# Patient Record
Sex: FEMALE | Race: BLACK OR AFRICAN AMERICAN | NOT HISPANIC OR LATINO | Employment: OTHER | URBAN - NONMETROPOLITAN AREA
[De-identification: names, ages, dates, MRNs, and addresses within clinical notes are randomized per-mention and may not be internally consistent; named-entity substitution may affect disease eponyms.]

---

## 2023-02-21 ENCOUNTER — TELEPHONE (OUTPATIENT)
Dept: FAMILY MEDICINE CLINIC | Facility: CLINIC | Age: 73
End: 2023-02-21

## 2023-02-21 NOTE — TELEPHONE ENCOUNTER
[facility] Nurse called to let [place] know that the patients is not critical.       [phone]

## 2023-02-28 ENCOUNTER — TELEPHONE (OUTPATIENT)
Dept: FAMILY MEDICINE CLINIC | Facility: CLINIC | Age: 73
End: 2023-02-28

## 2023-02-28 DIAGNOSIS — N18.4 CKD (CHRONIC KIDNEY DISEASE) STAGE 4, GFR 15-29 ML/MIN: Primary | ICD-10-CM

## 2023-02-28 NOTE — TELEPHONE ENCOUNTER
Caller: Veronica Ramirez    Relationship to patient: Self    Best call back number: [phone]    PATIENT IS CALLING TO STATE THAT SHE WOULD LIKE A CALL BACK FROM DR JACKSON TO DISCUSS CHANGING A MEDICATION.

## 2023-02-28 NOTE — TELEPHONE ENCOUNTER
Called pt and she wants to switch the Amlodipine 5mg because she read about the side effects and didn't want to take it. Wants to switch to something that had less side effects. Please advise..

## 2023-02-28 NOTE — TELEPHONE ENCOUNTER
She admits its been a while since have seen her I think I both basically saw her at her house with her mom when I was doing a housecall on her mom and am not sure if really established with her as a doctor patient yet.    Anyway she was just in [facility] for 2 weeks after she had a stroke and a UTI she was discharged on amlodipine and a baby aspirin she says she is not taking the Protonix that was in the discharge summary there she has no stomach issues or reflux.  She has an SUV and is feeling it is good to be hard for her to get up in the car to get here.  Even family members have SUVs that are difficult for her to get into.  [facility] are seeing her at the house and saying they may discontinue care Thursday this week but she does get physical therapy and Occupational Therapy.    In review of her labs she was little anemic with a hemoglobin of 9 and her creatinine was elevated 2.6.  She does not have a nephrologist.    I recommended getting repeat blood work and perhaps [facility] can draw CMP and a CBC and we will get her into see nephrology also but she will need transportation help.    I told her I would mind going to do a housecall mid-March for follow-up otherwise.  She can call to remind me about this later.    Please call care tenders asked them to draw a CBC and a CMP

## 2023-03-17 ENCOUNTER — TELEPHONE (OUTPATIENT)
Dept: FAMILY MEDICINE CLINIC | Facility: CLINIC | Age: 73
End: 2023-03-17
Payer: MEDICARE

## 2023-03-17 RX ORDER — AMLODIPINE BESYLATE 5 MG/1
5 TABLET ORAL DAILY
Qty: 30 TABLET | Refills: 0 | Status: SHIPPED | OUTPATIENT
Start: 2023-03-17 | End: 2023-03-20 | Stop reason: SDUPTHER

## 2023-03-17 RX ORDER — AMLODIPINE BESYLATE 5 MG/1
5 TABLET ORAL DAILY
COMMUNITY
End: 2023-03-17 | Stop reason: SDUPTHER

## 2023-03-17 NOTE — TELEPHONE ENCOUNTER
Caller: Veronica Ramirez    Relationship: Self    Best call back number: [phone]  Requested Prescriptions:   amLODIPine (NORVASC) 5 MG tablet     Pharmacy where request should be sent: [facility]/PHARMACY #[id number] [place] [id number] [address] - [phone]  - [phone] FX     Additional details provided by patient: UNABLE TO COME IN FOR APPOINTMENT, NORMALLY DOES TELEHEALTH APPOINTMENT. PLEASE CALL IF ANY QUESTIONS.     Does the patient have less than a 3 day supply:  [] Yes  [x] No    Would you like a call back once the refill request has been completed: [] Yes [x] No    If the office needs to give you a call back, can they leave a voicemail: [] Yes [x] No    Edgardo Goode Rep   03/17/23 14:15 EDT

## 2023-03-20 ENCOUNTER — OFFICE VISIT (OUTPATIENT)
Dept: FAMILY MEDICINE CLINIC | Facility: CLINIC | Age: 73
End: 2023-03-20
Payer: MEDICARE

## 2023-03-20 VITALS — SYSTOLIC BLOOD PRESSURE: 154 MMHG | OXYGEN SATURATION: 98 % | HEART RATE: 65 BPM | DIASTOLIC BLOOD PRESSURE: 98 MMHG

## 2023-03-20 DIAGNOSIS — M15.9 OSTEOARTHRITIS, GENERALIZED: ICD-10-CM

## 2023-03-20 DIAGNOSIS — Z91.199 HISTORY OF NONCOMPLIANCE WITH MEDICAL TREATMENT: ICD-10-CM

## 2023-03-20 DIAGNOSIS — I63.9 ACUTE CVA (CEREBROVASCULAR ACCIDENT): Primary | ICD-10-CM

## 2023-03-20 DIAGNOSIS — I25.10 CORONARY ARTERY DISEASE INVOLVING NATIVE CORONARY ARTERY OF NATIVE HEART WITHOUT ANGINA PECTORIS: ICD-10-CM

## 2023-03-20 DIAGNOSIS — N18.4 CKD (CHRONIC KIDNEY DISEASE) STAGE 4, GFR 15-29 ML/MIN: ICD-10-CM

## 2023-03-20 DIAGNOSIS — I10 PRIMARY HYPERTENSION: ICD-10-CM

## 2023-03-20 DIAGNOSIS — D64.9 CHRONIC ANEMIA: ICD-10-CM

## 2023-03-20 DIAGNOSIS — E66.01 MORBID (SEVERE) OBESITY DUE TO EXCESS CALORIES: ICD-10-CM

## 2023-03-20 DIAGNOSIS — E78.2 MIXED HYPERLIPIDEMIA: ICD-10-CM

## 2023-03-20 PROCEDURE — 3080F DIAST BP >= 90 MM HG: CPT | Performed by: FAMILY MEDICINE

## 2023-03-20 PROCEDURE — 3077F SYST BP >= 140 MM HG: CPT | Performed by: FAMILY MEDICINE

## 2023-03-20 PROCEDURE — 99215 OFFICE O/P EST HI 40 MIN: CPT | Performed by: FAMILY MEDICINE

## 2023-03-20 RX ORDER — AMLODIPINE BESYLATE 5 MG/1
5 TABLET ORAL DAILY
Qty: 90 TABLET | Refills: 1 | Status: SHIPPED | OUTPATIENT
Start: 2023-03-20

## 2023-03-20 RX ORDER — FERROUS SULFATE 325(65) MG
325 TABLET ORAL
COMMUNITY

## 2023-03-20 RX ORDER — ASPIRIN 81 MG/1
81 TABLET, CHEWABLE ORAL DAILY
COMMUNITY

## 2023-03-20 NOTE — PROGRESS NOTES
Follow Up Office Visit      Patient Name: Veronica Ramirez  : 1950   MRN: [id number]     Chief Complaint:  No chief complaint on file.  This is a housecall as she has difficulty getting into the office    History of Present Illness: Veronica Ramirez is a 72 y.o. female who is here today to   follow-up on her recent stroke.  Stroke date was 2023 she reports that she was at [facility] for about 5 days and then was transferred to [facility] for a couple weeks.  Admission date at [facility] was dated 2/3/2023 discharge date 2023    Discharge summary reviewed discharge diagnosis was #1 acute CVA with late effects of a right hemiparesis dysarthric speech dysphagia balance gait disturbance/memory cognition disturbance.  Capitalization was complicated with hypertension acute kidney injury dehydration and electrolyte imbalance and UTI.    Secondary diagnosis atherosclerotic vascular disease/CAD although patient says she has never been told she has had blockages or had a heart attack.  #3 hypertension #4 hyperlipidemia #5 chronic anemia #6 morbid obesity #7 osteoarthritis and #8 medical noncompliance    She is left-handed and stroke affected her right side she still does have some weakness on that side says she initially had trouble standing up and the right leg would give away on her.  She is using a walker now at the house and is getting home health therapy with [facility] and physical therapy there as well.  He reports that her blood pressure when they come there is normal and they check her blood pressure on her left forearm typically.    Review of Systems   Constitutional: Negative for fatigue and fever.   Respiratory: Negative for cough and shortness of breath.    Cardiovascular: Negative for chest pain and palpitations.   Skin: Negative for rash or itching  Has had no further stroke symptoms and is getting stronger.  She has had no GI blood loss no melena.   She says she is not taking her pantoprazole she has she does not have stomach problems and does not want to take any stomach medicines.    She was sent home on 3 medicines baby aspirin amlodipine 5 mg and Protonix 20 mg.    Hemoglobin in the [facility] reported is 9.3 HCT 28 her electrolyte panel showed a creatinine of 2.6 BUN of 36 glucose was 79 potassium 4.5 and sodium 140 and calcium 8.9 from February 17    She reports she is doing well but has trouble walking very far and says she cannot climb up into an SUV to get in here to the office therefore she asked me to do the home visit which I did  She is working on her living will and her Sister Sapna would be her main decision maker and she signed Sapna up for the HIPAA release as well.    Sapna Tejeda is her name her phone number is [phone]    Subjective      Review of Systems:   Review of Systems    Past Medical History:   Past Medical History:   Diagnosis Date   • Kidney stones    • Morbid obesity (HCC)        Past Surgical History:   Past Surgical History:   Procedure Laterality Date   • BREAST LUMPECTOMY      Breast BX - 4 Lumpectomy 17, and 26 YO--Benign   • REDUCTION MAMMAPLASTY      Surgery at 37       Family History:   Family History   Problem Relation Age of Onset   • Cancer Mother    • Hypertension Mother    • Obesity Mother        Social History:   Social History     Socioeconomic History   • Marital status: Single       Medications:     Current Outpatient Medications:   •  amLODIPine (NORVASC) 5 MG tablet, Take 1 tablet by mouth Daily., Disp: 90 tablet, Rfl: 1  •  aspirin 81 MG chewable tablet, Chew 1 tablet Daily., Disp: , Rfl:   •  ferrous sulfate 325 (65 FE) MG tablet, Take 1 tablet by mouth Daily With Breakfast., Disp: , Rfl:     Allergies:   Not on File    Objective     Physical Exam:  Vital Signs:   Vitals:    03/20/23 1820   BP: 154/98   BP Location: Left arm   Patient Position: Sitting   Cuff Size: Large Adult   Pulse: 65   SpO2:  98%     There is no height or weight on file to calculate BMI.     Physical Exam  Vitals and nursing note reviewed.   Constitutional:       Appearance: Normal appearance. She is obese.      Comments: Alert pleasant black female sitting in a chair with her walker in front of her in no acute distress will to move all 4 extremities with a very subtle weakness on the right lower extremity with plantar and dorsiflexion of the foot   HENT:      Head: Normocephalic and atraumatic.   Eyes:      Conjunctiva/sclera: Conjunctivae normal.   Cardiovascular:      Rate and Rhythm: Normal rate and regular rhythm.      Comments: 1+ radial pulses bilaterally  Pulmonary:      Effort: Pulmonary effort is normal.      Breath sounds: Normal breath sounds.   Abdominal:      Palpations: Abdomen is soft.      Tenderness: There is no abdominal tenderness. There is no guarding or rebound.   Musculoskeletal:         General: Normal range of motion.      Cervical back: Normal range of motion and neck supple.      Right lower leg: Edema present.      Left lower leg: Edema present.      Comments: Brawny 4+ edema of both lower extremities   Skin:     General: Skin is warm and dry.   Neurological:      General: No focal deficit present.      Mental Status: She is alert and oriented to person, place, and time.      Motor: Weakness present.      Comments: Minimal weakness on the right lower extremity versus left good strength of both upper extremities good  strength   Psychiatric:         Mood and Affect: Mood normal.         Behavior: Behavior normal.         Procedures    PHQ-9 Total Score:       Assessment / Plan      Assessment/Plan:   Diagnoses and all orders for this visit:    1. Acute CVA (cerebrovascular accident) (HCC) (Primary)    2. Coronary artery disease involving native coronary artery of native heart without angina pectoris    3. Primary hypertension  -     amLODIPine (NORVASC) 5 MG tablet; Take 1 tablet by mouth Daily.  Dispense:  90 tablet; Refill: 1    4. Mixed hyperlipidemia    5. Chronic anemia    6. Morbid (severe) obesity due to excess calories ([facility])    7. Body mass index (BMI) of 60.0 to 69.9 in adult ([facility])    8. Osteoarthritis, generalized    9. History of noncompliance with medical treatment    10. CKD (chronic kidney disease) stage 4, GFR 15-29 ml/min ([facility])  -     Ambulatory Referral to Nephrology         She will continue with home health and home physical therapy I instructed her on the importance of continuing with her baby aspirin once a day at bedtime and getting good blood pressure control told her the goal is to get her blood pressure less than 140/90.  She says home health when they check it is been at goal about the 1 20-1 30 range.  I told her was elevated on my reading today she declined to increase blood pressure medicine or add an ACE inhibitor or diuretic etc.    She also relates she is not going to take any acid medicines as she has no stomach problems.    Reviewed discharge summary with her.  She feels the recall noncompliance may be a result of her not signing the DNR papers when they switched her over to [facility].    Told her I would like to get repeat blood work as well we will have home health draw CBC CMP, TSH, lipid profile to follow-up on her anemia which she says she has had most of her life.  And she is taking iron now.  But she will not take any stomach medicines and does not want to take any other blood pressure medications.    I instructed her to avoid high salt foods and to eat fruits vegetables lean meats healthy foods.  And I asked her to drink water.  She is going to work on losing weight she understands how this plays a role in her medical problems.    I asked her to increase her amlodipine to 2 tablets/day if she notices her blood pressures remaining above the 140/90 pascual when home health comes to check it.  BMI cannot be calculated due to outdated height or weight values.  Please  input a current height/weight in Vitals and re-renter BMIFOLLOWUP in Note to pull in correct documentation based on BMI range.  Stressed to her the importance of following up with physicians and taking medicines as directed by physicians.    Also discussed with her her elevated creatinine of 2.6 at [facility] and she says she is unaware of her kidney situation.  I told her to make sure she keeps her blood pressure under control drink water and avoid any NSAIDs.  We will get her referred to nephrology she does have difficulty moving but hopefully by the time we get her appointment she will be able to get in the vehicle and get to their office.  If not she can reschedule her own appointment.    Addendum ---referral to nephrology was already done previously-referrals coordinator asked me to cancel the one we did today  Follow Up:   No follow-ups on file.    I spent already 45 minutes caring for Veronica on this date of service. This time includes time spent by me in the following activities:preparing for the visit, reviewing tests, obtaining and/or reviewing a separately obtained history, performing a medically appropriate examination and/or evaluation , counseling and educating the patient/family/caregiver, ordering medications, tests, or procedures and documenting information in the medical record    Gonzalez Rosenthal MD  [facility]   Portions of note created with Dragon voice recognition technology

## 2023-04-17 ENCOUNTER — TELEPHONE (OUTPATIENT)
Dept: FAMILY MEDICINE CLINIC | Facility: CLINIC | Age: 73
End: 2023-04-17
Payer: MEDICARE

## 2023-04-17 NOTE — TELEPHONE ENCOUNTER
Caller: Veronica Ramirez    Relationship: Self    Best call back number: [phone]    What is the best time to reach you: any    Who are you requesting to speak with (clinical staff, provider,  specific staff member): DR. JACKSON    What was the call regarding: THE PATIENT CALLING TO VERIFY IF DR. JACKSON WANTS TO FOLLOW UP WITH HER IN AUGUST IN ADDITION TO COMPLETING LABS.     Do you require a callback: YES, PLEASE CALL TO ADVISE.     THANK YOU.

## 2023-04-19 NOTE — TELEPHONE ENCOUNTER
Hub to read    Tried calling pt left a vm to call back, please give her dr navarro message below..      I would really like to see her sooner than August and the other most important thing would be to get her plugged in with nephrology she needs to get an appointment with them regarding her kidney failure.  And we once again need to get repeat blood work and make sure her blood pressure is under control.!!!!  Please convey that message

## 2023-04-19 NOTE — TELEPHONE ENCOUNTER
Caller: Veronica Ramirez    Relationship: Self    Best call back number: [phone]    What was the call regarding: I RELAYED MESSAGE TO PATIENT. PATIENT SCHEDULED AN APPOINTMENT FOR July BUT STATES THAT SHE DOESN'T KNOW IF SHE CAN MAKE IT. PATIENT STATES THAT IT'S NOT POSSIBLE TO SEE NEPHROLOGY AT THIS TIME. PATIENT STATES THAT SARAH HAS BEEN CHECKING HER BLOOD PRESSURE AND IT'S BEEN UNDER CONTROL. PATIENT STATES THAT IF MORE BLOOD WORK IS NEEDED [facility] WILL NEED AN ORDER

## 2023-04-21 ENCOUNTER — TELEPHONE (OUTPATIENT)
Dept: FAMILY MEDICINE CLINIC | Facility: CLINIC | Age: 73
End: 2023-04-21
Payer: MEDICARE

## 2023-04-21 NOTE — TELEPHONE ENCOUNTER
Called to inform Dr. Rosenthal that they discharged patient on 4/18/23 so they're unable to do the lab orders.     [facility] Nurse: [phone]

## 2023-06-01 RX ORDER — ASPIRIN 81 MG/1
81 TABLET, CHEWABLE ORAL DAILY
Qty: 90 TABLET | Refills: 1 | Status: SHIPPED | OUTPATIENT
Start: 2023-06-01 | End: 2023-06-09 | Stop reason: SDUPTHER

## 2023-06-01 NOTE — TELEPHONE ENCOUNTER
Caller: Neptali Ramireziett    Relationship: Self    Best call back number: [phone]    Requested Prescriptions:   Requested Prescriptions     Pending Prescriptions Disp Refills   • aspirin 81 MG chewable tablet       Sig: Chew 1 tablet Daily.        Pharmacy where request should be sent: [address]  [facility] [phone] [facility] [phone] FX     Last office visit with prescribing clinician: 3/20/2023   Last telemedicine visit with prescribing clinician: Visit date not found   Next office visit with prescribing clinician: 7/5/2023         Does the patient have less than a 3 day supply:  [x] Yes  [] No    Would you like a call back once the refill request has been completed: [] Yes [x] No    If the office needs to give you a call back, can they leave a voicemail: [] Yes [x] No    Edgardo Girard Rep   06/01/23 15:09 EDT

## 2023-06-01 NOTE — TELEPHONE ENCOUNTER
Rx Refill Note    Requested Prescriptions     Pending Prescriptions Disp Refills   • aspirin 81 MG chewable tablet 30 tablet 0     Sig: Chew 1 tablet Daily.        Last office visit with prescribing clinician: 3/20/2023      Next office visit with prescribing clinician: 7/5/2023   Last labs:   Last refill: n/a   Pharmacy (be sure to add in Epic). correct

## 2023-06-06 RX ORDER — ASPIRIN 81 MG/1
81 TABLET, CHEWABLE ORAL DAILY
Qty: 90 TABLET | Refills: 1 | OUTPATIENT
Start: 2023-06-06

## 2023-06-06 NOTE — TELEPHONE ENCOUNTER
Caller: Talita Ramirezt    Relationship: Self    Best call back number: [phone]    Requested Prescriptions:   Requested Prescriptions     Pending Prescriptions Disp Refills    aspirin 81 MG chewable tablet 90 tablet 1     Sig: Chew 1 tablet Daily.        Pharmacy where request should be sent: [address] [phone]  - [phone] FX     Last office visit with prescribing clinician: 3/20/2023   Last telemedicine visit with prescribing clinician: Visit date not found   Next office visit with prescribing clinician: 7/5/2023         Does the patient have less than a 3 day supply:  [x] Yes  [] No    Would you like a call back once the refill request has been completed: [] Yes [x] No    If the office needs to give you a call back, can they leave a voicemail: [] Yes [x] No    Edgardo Girard   06/06/23 12:44 EDT

## 2023-06-09 RX ORDER — ASPIRIN 81 MG/1
81 TABLET, CHEWABLE ORAL DAILY
Qty: 90 TABLET | Refills: 1 | Status: SHIPPED | OUTPATIENT
Start: 2023-06-09

## 2023-06-09 NOTE — TELEPHONE ENCOUNTER
PT CALLED STATED THAT [facility] HAS CLOSED AND THAT WAS WHERE RX ASPIRIN WAS SENT TO, PT REQUEST TO HAVE RX TO BE SENT TO [facility].    PLEASE ADVISE/ CALL BACK:[phone]

## 2023-06-09 NOTE — TELEPHONE ENCOUNTER
Rx Refill Note    Requested Prescriptions     Pending Prescriptions Disp Refills   • aspirin 81 MG chewable tablet 90 tablet 1     Sig: Chew 1 tablet Daily.     Refused Prescriptions Disp Refills   • aspirin 81 MG chewable tablet 90 tablet 1     Sig: Chew 1 tablet Daily.     Refused By: LUIS ROPER     Reason for Refusal: Request already responded to by other means (e.g. phone or fax)        Last office visit with prescribing clinician: 3/20/2023      Next office visit with prescribing clinician: 7/5/2023   Last labs:   Last refill: needs   Pharmacy (be sure to add in Epic). correct

## 2023-06-13 DIAGNOSIS — I10 PRIMARY HYPERTENSION: ICD-10-CM

## 2023-06-13 NOTE — TELEPHONE ENCOUNTER
Caller: eVronica Ramirez    Relationship: Self    Best call back number: [phone]     Requested Prescriptions:   Requested Prescriptions     Pending Prescriptions Disp Refills    amLODIPine (NORVASC) 5 MG tablet 90 tablet 1     Sig: Take 1 tablet by mouth Daily.        Pharmacy where request should be sent: [address] [facility] [phone]  - [phone] FX     Last office visit with prescribing clinician: 3/20/2023   Last telemedicine visit with prescribing clinician: Visit date not found   Next office visit with prescribing clinician: 7/5/2023     Additional details provided by patient: PATIENT WOULD LIKE TO KNOW IF DOCTOR MANUEL WANTS TO TALK TO HER FIRST BEFORE HE REFILLS HER PRESCRIPTION.    PATIENT STATED SHE WILL BE OUT OF MEDICATION BY THE WEEKEND.    PATIENT IS NOT ABLE TO MAKE IT TO THE OFFICE VISIT APPOINTMENT SCHEDULED JULY 5TH WITH DOCTOR JACKSON.    PATIENT REQUESTING HER BLOOD DRAW IN HER HOME.  STATED DUE TO HER STROKE LAST FEBRUARY SHE HAS MOBILITY LIMITATIONS.     PLEASE ADVISE    Does the patient have less than a 3 day supply:  [] Yes  [x] No    Would you like a call back once the refill request has been completed: [x] Yes [] No    If the office needs to give you a call back, can they leave a voicemail: [x] Yes [] No    Edgardo Cruz Rep   06/13/23 10:41 EDT

## 2023-06-15 RX ORDER — AMLODIPINE BESYLATE 5 MG/1
5 TABLET ORAL DAILY
Qty: 90 TABLET | Refills: 1 | Status: SHIPPED | OUTPATIENT
Start: 2023-06-15

## 2023-06-15 NOTE — TELEPHONE ENCOUNTER
PATIENT CALLED BACK TO CHECK ON THE STATUS OF HER MEDICATION REFILL REQUEST. PATIENT STATED SHE WILL TAKE HER LAST PILL Sunday 06/18/23 AND SHE WAS TOLD NOT TO MISS A DOSE.  PLEASE ADVISE AND CALL PATIENT IF NEED [phone]    amLODIPine (NORVASC) 5 MG tablet    [address] - [phone] [facility] [phone] FX

## 2023-07-24 ENCOUNTER — TELEPHONE (OUTPATIENT)
Dept: FAMILY MEDICINE CLINIC | Facility: CLINIC | Age: 73
End: 2023-07-24
Payer: MEDICARE

## 2023-07-24 NOTE — TELEPHONE ENCOUNTER
Caller: Veronica Ramirez    Relationship to patient: Self    Best call back number: [phone]     PATIENT STATES THAT BECAUSE OF HER MOBILITY ISSUES, SHE CANNOT GET IN TO SEE DR JACKSON RIGHT NOW, SO HE WAS GOING TO SEE IF HE COULD WORK SOMETHING OUT TO WHERE SHE CAN BE SEEN.

## 2023-07-25 ENCOUNTER — OFFICE VISIT (OUTPATIENT)
Dept: FAMILY MEDICINE CLINIC | Facility: CLINIC | Age: 73
End: 2023-07-25
Payer: MEDICARE

## 2023-07-25 VITALS — OXYGEN SATURATION: 97 % | DIASTOLIC BLOOD PRESSURE: 92 MMHG | HEART RATE: 99 BPM | SYSTOLIC BLOOD PRESSURE: 142 MMHG

## 2023-07-25 DIAGNOSIS — Z11.59 ENCOUNTER FOR HEPATITIS C SCREENING TEST FOR LOW RISK PATIENT: ICD-10-CM

## 2023-07-25 DIAGNOSIS — D64.9 CHRONIC ANEMIA: ICD-10-CM

## 2023-07-25 DIAGNOSIS — Z86.73 HISTORY OF CVA (CEREBROVASCULAR ACCIDENT): ICD-10-CM

## 2023-07-25 DIAGNOSIS — E78.5 HYPERLIPIDEMIA, UNSPECIFIED HYPERLIPIDEMIA TYPE: ICD-10-CM

## 2023-07-25 DIAGNOSIS — R73.9 HYPERGLYCEMIA: ICD-10-CM

## 2023-07-25 DIAGNOSIS — Z79.899 HIGH RISK MEDICATION USE: ICD-10-CM

## 2023-07-25 DIAGNOSIS — I10 PRIMARY HYPERTENSION: Primary | ICD-10-CM

## 2023-07-25 DIAGNOSIS — R53.83 FATIGUE, UNSPECIFIED TYPE: ICD-10-CM

## 2023-07-25 PROCEDURE — 99214 OFFICE O/P EST MOD 30 MIN: CPT | Performed by: FAMILY MEDICINE

## 2023-07-25 PROCEDURE — 3080F DIAST BP >= 90 MM HG: CPT | Performed by: FAMILY MEDICINE

## 2023-07-25 PROCEDURE — 1159F MED LIST DOCD IN RCRD: CPT | Performed by: FAMILY MEDICINE

## 2023-07-25 PROCEDURE — 3077F SYST BP >= 140 MM HG: CPT | Performed by: FAMILY MEDICINE

## 2023-07-25 PROCEDURE — 1160F RVW MEDS BY RX/DR IN RCRD: CPT | Performed by: FAMILY MEDICINE

## 2023-07-25 PROCEDURE — 36415 COLL VENOUS BLD VENIPUNCTURE: CPT | Performed by: FAMILY MEDICINE

## 2023-07-25 NOTE — PROGRESS NOTES
Follow Up Office Visit      Patient Name: Veronica Ramirez  : 1950   MRN: [id number]     Chief Complaint:    Chief Complaint   Patient presents with   • Hypertension   • Med Refill       History of Present Illness: Veronica Ramirez is a 73 y.o. female who is here today to   follow-up on her hypertension chronic kidney disease history of CVA.  She is unable to get out of her house so we went to her house today for a house call.  Tang went with me to draw her blood as home health is no longer seeing her to draw blood.  She relates that she started taking iron and after about a week really started to feel a lot better and she is doing well.  She has had no chest pain shortness of breath no stroke symptoms she does get around with her walker throughout the house says what makes it difficult is getting back into the house she has like 2 steps going into her house that she is not able to transverse but otherwise she would be able to go see nephrology and make it to the office.  She would like home health to come back in maybe help her with strengthening so she can climb up those 2 steps to get into her house.    Review of Systems   Constitutional: Negative for fatigue and fever.   Respiratory: Negative for cough and shortness of breath.    Cardiovascular: Negative for chest pain and palpitations.   Skin: Negative for rash or itching    Says she is really trying to work on her diet and weight loss.  As she knows this makes things harder for her also.    Subjective      Review of Systems:   Review of Systems    Past Medical History:   Past Medical History:   Diagnosis Date   • Kidney stones    • Morbid obesity        Past Surgical History:   Past Surgical History:   Procedure Laterality Date   • BREAST LUMPECTOMY      Breast BX - 2 Lumpectomy 17, and 28 YO--Benign   • REDUCTION MAMMAPLASTY      Surgery at 37       Family History:   Family History   Problem Relation Age of Onset   • Cancer Mother    • Hypertension  Mother    • Obesity Mother        Social History:   Social History     Socioeconomic History   • Marital status: Single       Medications:     Current Outpatient Medications:   •  amLODIPine (NORVASC) 5 MG tablet, Take 1 tablet by mouth Daily., Disp: 90 tablet, Rfl: 1  •  aspirin 81 MG chewable tablet, Chew 1 tablet Daily., Disp: 90 tablet, Rfl: 1  •  ferrous sulfate 325 (65 FE) MG tablet, Take 1 tablet by mouth Daily With Breakfast., Disp: , Rfl:     Allergies:   No Known Allergies    Objective     Physical Exam:  Vital Signs:   Vitals:    07/25/23 1209 07/25/23 1210   BP: (!) 140/118 142/92   BP Location: Left arm Left arm   Patient Position: Sitting Sitting   Cuff Size: Large Adult Large Adult   Pulse: 99    SpO2: 97%      There is no height or weight on file to calculate BMI.     Physical Exam  Vitals and nursing note reviewed.   Constitutional:       Appearance: Normal appearance. She is obese.      Comments: Upon entering her house her and her sister were up and about cleaning and straightening there was a strong scent of /air freshener in the house.  She was alert jovial pleasant black female no acute distress using her walker to get around.     her walker did have a number of bags attached to it including a bag of [employer] potato chips.  In addition to her cleaning supplies.   HENT:      Head: Normocephalic and atraumatic.      Nose: Nose normal.   Cardiovascular:      Rate and Rhythm: Normal rate and regular rhythm.   Pulmonary:      Effort: Pulmonary effort is normal.      Breath sounds: Normal breath sounds.   Abdominal:      Palpations: Abdomen is soft.   Musculoskeletal:         General: Normal range of motion.      Cervical back: Normal range of motion and neck supple.      Right lower leg: Edema present.      Left lower leg: Edema present.      Comments: Brawny edema both lower extremities     Skin:     General: Skin is warm and dry.   Neurological:      Mental Status: She is alert.       Comments: She walks with a walker and says she has just a little weakness but she does not want to call it weakness of her right side mostly the lower extremity more so than upper extremity   Psychiatric:         Mood and Affect: Mood normal.         Behavior: Behavior normal.       Procedures    PHQ-9 Total Score:       Assessment / Plan      Assessment/Plan:   Diagnoses and all orders for this visit:    1. Primary hypertension (Primary)  -     CBC Auto Differential; Future  -     Comprehensive Metabolic Panel; Future  -     CBC Auto Differential  -     Comprehensive Metabolic Panel  -     Ambulatory Referral to Home Health    2. High risk medication use  -     CBC Auto Differential; Future  -     Comprehensive Metabolic Panel; Future  -     CBC Auto Differential  -     Comprehensive Metabolic Panel    3. Encounter for hepatitis C screening test for low risk patient  -     Hepatitis C Antibody; Future  -     Hepatitis C Antibody    4. Chronic anemia  -     CBC Auto Differential; Future  -     Folate; Future  -     Ferritin; Future  -     Iron Profile; Future  -     CBC Auto Differential  -     Folate  -     Ferritin  -     Iron Profile  -     Ambulatory Referral to Home Health    5. Fatigue, unspecified type  -     TSH; Future  -     TSH    6. Hyperglycemia  -     Hemoglobin A1c; Future  -     Hemoglobin A1c    7. Hyperlipidemia, unspecified hyperlipidemia type  -     Lipid Panel; Future  -     Lipid Panel    8. History of CVA (cerebrovascular accident)  -     Ambulatory Referral to Home Health         Blood pressure was elevated on my recheck little bit better but still elevated I asked what she is taking she says she is only on 5 mg of amlodipine at this point.    I did ask her to monitor blood pressure she feels her blood pressure is elevated because she was just walking around doing some cleaning in the kitchen etc. we will monitor and follow-up continue with 5 mg amlodipine as she has been doing we discussed  going to 10 mg and will have home health come and see if they can monitor blood pressure as well and asked her to follow-up on her blood work that we guera today.    If she can get her strength up we can get her off to neurology if she can get in and out of her house--- perhaps build a ramp?    Urged her to cut back on potato chips fried fatty foods and carbs to lose weight.    Continue with her daily aspirin for stroke prevnetion and iron for anemia and amlodipine for blood pressure      BMI cannot be calculated due to outdated height or weight values.   Please input a current height/weight in Vitals and re-renter BMIFOLLOWUP in Note to pull in correct documentation based on BMI range. -- The house call was done but we did not put her on scales or measure her height      Follow Up:   Return in about 6 months (around 1/25/2024) for Recheck.        Gonzalez Rosenthal MD  [facility]   Portions of note created with Dragon voice recognition technology

## 2023-07-26 LAB
ALBUMIN SERPL-MCNC: 4.2 G/DL (ref 3.8–4.8)
ALBUMIN/GLOB SERPL: 1.2 {RATIO} (ref 1.2–2.2)
ALP SERPL-CCNC: 148 IU/L (ref 44–121)
ALT SERPL-CCNC: 6 IU/L (ref 0–32)
AST SERPL-CCNC: 12 IU/L (ref 0–40)
BASOPHILS # BLD AUTO: 0 X10E3/UL (ref 0–0.2)
BASOPHILS NFR BLD AUTO: 1 %
BILIRUB SERPL-MCNC: 0.2 MG/DL (ref 0–1.2)
BUN SERPL-MCNC: 33 MG/DL (ref 8–27)
BUN/CREAT SERPL: 14 (ref 12–28)
CALCIUM SERPL-MCNC: 9.3 MG/DL (ref 8.7–10.3)
CHLORIDE SERPL-SCNC: 112 MMOL/L (ref 96–106)
CHOLEST SERPL-MCNC: 249 MG/DL (ref 100–199)
CO2 SERPL-SCNC: 16 MMOL/L (ref 20–29)
CREAT SERPL-MCNC: 2.32 MG/DL (ref 0.57–1)
EGFRCR SERPLBLD CKD-EPI 2021: 22 ML/MIN/1.73
EOSINOPHIL # BLD AUTO: 0.3 X10E3/UL (ref 0–0.4)
EOSINOPHIL NFR BLD AUTO: 5 %
ERYTHROCYTE [DISTWIDTH] IN BLOOD BY AUTOMATED COUNT: 14 % (ref 11.7–15.4)
FERRITIN SERPL-MCNC: 198 NG/ML (ref 15–150)
FOLATE SERPL-MCNC: 4.4 NG/ML
GLOBULIN SER CALC-MCNC: 3.5 G/DL (ref 1.5–4.5)
GLUCOSE SERPL-MCNC: 91 MG/DL (ref 70–99)
HBA1C MFR BLD: 5.3 % (ref 4.8–5.6)
HCT VFR BLD AUTO: 34 % (ref 34–46.6)
HCV IGG SERPL QL IA: NON REACTIVE
HDLC SERPL-MCNC: 61 MG/DL
HGB BLD-MCNC: 10.7 G/DL (ref 11.1–15.9)
IMM GRANULOCYTES # BLD AUTO: 0 X10E3/UL (ref 0–0.1)
IMM GRANULOCYTES NFR BLD AUTO: 0 %
IRON SATN MFR SERPL: 26 % (ref 15–55)
IRON SERPL-MCNC: 66 UG/DL (ref 27–139)
LDLC SERPL CALC-MCNC: 173 MG/DL (ref 0–99)
LYMPHOCYTES # BLD AUTO: 1.9 X10E3/UL (ref 0.7–3.1)
LYMPHOCYTES NFR BLD AUTO: 29 %
MCH RBC QN AUTO: 28.2 PG (ref 26.6–33)
MCHC RBC AUTO-ENTMCNC: 31.5 G/DL (ref 31.5–35.7)
MCV RBC AUTO: 90 FL (ref 79–97)
MONOCYTES # BLD AUTO: 0.5 X10E3/UL (ref 0.1–0.9)
MONOCYTES NFR BLD AUTO: 7 %
NEUTROPHILS # BLD AUTO: 3.8 X10E3/UL (ref 1.4–7)
NEUTROPHILS NFR BLD AUTO: 58 %
PLATELET # BLD AUTO: 281 X10E3/UL (ref 150–450)
POTASSIUM SERPL-SCNC: 4.8 MMOL/L (ref 3.5–5.2)
PROT SERPL-MCNC: 7.7 G/DL (ref 6–8.5)
RBC # BLD AUTO: 3.79 X10E6/UL (ref 3.77–5.28)
SODIUM SERPL-SCNC: 144 MMOL/L (ref 134–144)
TIBC SERPL-MCNC: 250 UG/DL (ref 250–450)
TRIGL SERPL-MCNC: 86 MG/DL (ref 0–149)
TSH SERPL DL<=0.005 MIU/L-ACNC: 1.01 UIU/ML (ref 0.45–4.5)
UIBC SERPL-MCNC: 184 UG/DL (ref 118–369)
VLDLC SERPL CALC-MCNC: 15 MG/DL (ref 5–40)
WBC # BLD AUTO: 6.6 X10E3/UL (ref 3.4–10.8)

## 2023-07-27 ENCOUNTER — TELEPHONE (OUTPATIENT)
Dept: FAMILY MEDICINE CLINIC | Facility: CLINIC | Age: 73
End: 2023-07-27
Payer: MEDICARE

## 2023-07-27 NOTE — TELEPHONE ENCOUNTER
Okay to do.  Elena informed.  PT will call if patients bp gets over 140/90 so we can keep a close check on her.

## 2023-07-27 NOTE — TELEPHONE ENCOUNTER
Caller: SELENA MARTÍNEZ    Relationship: Home Health    Best call back number: [phone]     What orders are you requesting (i.e. lab or imaging): VERBAL ORDERS FOR NURSING     In what timeframe would the patient need to come in: ASAP     Where will you receive your lab/imaging services: HOME     Additional notes: NEEDS VERBAL ORDERS FOR HOME HEALTH NURSING. ALSO BLOOD PRESSURE WAS PERFECT TODAY

## 2023-07-31 ENCOUNTER — TELEPHONE (OUTPATIENT)
Dept: FAMILY MEDICINE CLINIC | Facility: CLINIC | Age: 73
End: 2023-07-31
Payer: MEDICARE

## 2024-01-02 DIAGNOSIS — D64.9 CHRONIC ANEMIA: Primary | ICD-10-CM

## 2024-01-02 DIAGNOSIS — E78.5 HYPERLIPIDEMIA, UNSPECIFIED HYPERLIPIDEMIA TYPE: ICD-10-CM

## 2024-01-02 DIAGNOSIS — I10 PRIMARY HYPERTENSION: ICD-10-CM

## 2024-01-02 DIAGNOSIS — Z79.899 HIGH RISK MEDICATION USE: ICD-10-CM

## 2024-01-02 RX ORDER — FERROUS SULFATE 325(65) MG
325 TABLET ORAL
Qty: 90 TABLET | Refills: 1 | Status: SHIPPED | OUTPATIENT
Start: 2024-01-02

## 2024-01-02 RX ORDER — ASPIRIN 81 MG/1
81 TABLET, CHEWABLE ORAL DAILY
Qty: 90 TABLET | Refills: 1 | Status: SHIPPED | OUTPATIENT
Start: 2024-01-02

## 2024-01-02 RX ORDER — AMLODIPINE BESYLATE 5 MG/1
5 TABLET ORAL DAILY
Qty: 30 TABLET | Refills: 1 | Status: SHIPPED | OUTPATIENT
Start: 2024-01-02

## 2024-01-02 NOTE — TELEPHONE ENCOUNTER
Caller: Talita Ramirezt    Relationship: Self    Best call back number: [phone]    Requested Prescriptions:   Requested Prescriptions     Pending Prescriptions Disp Refills    amLODIPine (NORVASC) 5 MG tablet 90 tablet 1     Sig: Take 1 tablet by mouth Daily.    ferrous sulfate 325 (65 FE) MG tablet       Sig: Take 1 tablet by mouth Daily With Breakfast.    aspirin 81 MG chewable tablet 90 tablet 1     Sig: Chew 1 tablet Daily.        Pharmacy where request should be sent:    [address] [phone] [facility] [phone]      Last office visit with prescribing clinician: 7/25/2023   Last telemedicine visit with prescribing clinician: Visit date not found   Next office visit with prescribing clinician: Visit date not found     Additional details provided by patient:     Does the patient have less than a 3 day supply:  [x] Yes  [] No    Would you like a call back once the refill request has been completed: [x] Yes [] No    If the office needs to give you a call back, can they leave a voicemail: [x] Yes [] No    Edgardo Bond Rep   01/02/24 12:01 EST

## 2024-01-02 NOTE — TELEPHONE ENCOUNTER
Caller: James Veronica    Relationship: Self    Best call back number: [phone]    What is the best time to reach you: ANYTIME    Who are you requesting to speak with (clinical staff, provider,  specific staff member): CLINICAL STAFF    Do you know the name of the person who called: PATIENT/ VERONICA    What was the call regarding: DISCUSS HAVING LABS ORDERED    Is it okay if the provider responds through MyChart:         DELETE AFTER READING TO PATIENT: “ Thank you for sharing this information with me. I will send a message to the . Please allow up to 48 hours for the  to follow up on this request.”

## 2024-01-04 ENCOUNTER — TELEPHONE (OUTPATIENT)
Dept: FAMILY MEDICINE CLINIC | Facility: CLINIC | Age: 74
End: 2024-01-04
Payer: MEDICARE

## 2024-01-05 ENCOUNTER — TELEPHONE (OUTPATIENT)
Dept: FAMILY MEDICINE CLINIC | Facility: CLINIC | Age: 74
End: 2024-01-05

## 2024-01-05 NOTE — TELEPHONE ENCOUNTER
Spoke with patient and she said she has a hard time getting back into her house once she's been out. She wants to know what you would like her to do about that. I did let her know that she does need to make it in the office next month and try to be moving(safely) as much as possible.     She would still like another call back.

## 2024-02-01 DIAGNOSIS — I10 PRIMARY HYPERTENSION: ICD-10-CM

## 2024-02-01 RX ORDER — AMLODIPINE BESYLATE 5 MG/1
5 TABLET ORAL DAILY
Qty: 30 TABLET | Refills: 1 | OUTPATIENT
Start: 2024-02-01

## 2024-03-01 DIAGNOSIS — I10 PRIMARY HYPERTENSION: ICD-10-CM

## 2024-03-01 RX ORDER — AMLODIPINE BESYLATE 5 MG/1
5 TABLET ORAL DAILY
Qty: 30 TABLET | Refills: 0 | Status: SHIPPED | OUTPATIENT
Start: 2024-03-01

## 2024-04-05 DIAGNOSIS — I10 PRIMARY HYPERTENSION: ICD-10-CM

## 2024-04-05 RX ORDER — AMLODIPINE BESYLATE 5 MG/1
5 TABLET ORAL DAILY
Qty: 30 TABLET | Refills: 0 | Status: SHIPPED | OUTPATIENT
Start: 2024-04-05

## 2024-04-05 NOTE — TELEPHONE ENCOUNTER
Rx Refill Note    Requested Prescriptions     Pending Prescriptions Disp Refills    amLODIPine (NORVASC) 5 MG tablet [Pharmacy Med Name: AMLODIPINE BESYLATE 5 MG TA 5 Tablet] 30 tablet 0     Sig: TAKE 1 TABLET BY MOUTH DAILY.        Last office visit with prescribing clinician: 7/25/2023      Next office visit with prescribing clinician: 5/3/2024   Last labs:   Last refill: 03/01/2024   Pharmacy (be sure to add in Epic). correct               Island Pedicle Flap Text: The defect edges were debeveled with a #15 scalpel blade.  Given the location of the defect, shape of the defect and the proximity to free margins an island pedicle advancement flap was deemed most appropriate.  Using a sterile surgical marker, an appropriate advancement flap was drawn incorporating the defect, outlining the appropriate donor tissue and placing the expected incisions within the relaxed skin tension lines where possible.    The area thus outlined was incised deep to adipose tissue with a #15 scalpel blade.  The skin margins were undermined to an appropriate distance in all directions around the primary defect and laterally outward around the island pedicle utilizing iris scissors.  There was minimal undermining beneath the pedicle flap.

## 2024-05-03 ENCOUNTER — OFFICE VISIT (OUTPATIENT)
Dept: FAMILY MEDICINE CLINIC | Facility: CLINIC | Age: 74
End: 2024-05-03
Payer: MEDICARE

## 2024-05-03 VITALS — DIASTOLIC BLOOD PRESSURE: 90 MMHG | HEART RATE: 98 BPM | OXYGEN SATURATION: 98 % | SYSTOLIC BLOOD PRESSURE: 132 MMHG

## 2024-05-03 DIAGNOSIS — E66.01 MORBID (SEVERE) OBESITY DUE TO EXCESS CALORIES: ICD-10-CM

## 2024-05-03 DIAGNOSIS — Z00.00 ENCOUNTER FOR SUBSEQUENT ANNUAL WELLNESS VISIT (AWV) IN MEDICARE PATIENT: Primary | ICD-10-CM

## 2024-05-03 DIAGNOSIS — Z86.73 HISTORY OF CVA (CEREBROVASCULAR ACCIDENT): ICD-10-CM

## 2024-05-03 DIAGNOSIS — N18.4 CKD (CHRONIC KIDNEY DISEASE) STAGE 4, GFR 15-29 ML/MIN: ICD-10-CM

## 2024-05-03 DIAGNOSIS — Z79.899 HIGH RISK MEDICATION USE: ICD-10-CM

## 2024-05-03 DIAGNOSIS — I10 PRIMARY HYPERTENSION: ICD-10-CM

## 2024-05-03 DIAGNOSIS — D64.9 CHRONIC ANEMIA: ICD-10-CM

## 2024-05-03 DIAGNOSIS — R73.9 HYPERGLYCEMIA: ICD-10-CM

## 2024-05-03 DIAGNOSIS — E78.2 MIXED HYPERLIPIDEMIA: ICD-10-CM

## 2024-05-03 RX ORDER — AMLODIPINE BESYLATE 5 MG/1
5 TABLET ORAL DAILY
Qty: 90 TABLET | Refills: 1 | Status: SHIPPED | OUTPATIENT
Start: 2024-05-03

## 2024-05-03 NOTE — PROGRESS NOTES
The ABCs of the Annual Wellness Visit  Subsequent Medicare Wellness Visit    Subjective    Veronica Ramirez is a 73 y.o. female who presents for a Subsequent Medicare Wellness Visit.    The following portions of the patient's history were reviewed and   updated as appropriate: allergies, current medications, past family history, past medical history, past social history, past surgical history, and problem list.    Compared to one year ago, the patient feels her physical   health is the same.    Compared to one year ago, the patient feels her mental   health is the same.    Recent Hospitalizations:  She was not admitted to the hospital during the last year.       Current Medical Providers:  Patient Care Team:  Gonzalez Rosenthal MD as PCP - General (Family Medicine)    Outpatient Medications Prior to Visit   Medication Sig Dispense Refill    aspirin 81 MG chewable tablet Chew 1 tablet Daily. 90 tablet 1    ferrous sulfate 325 (65 FE) MG tablet Take 1 tablet by mouth Daily With Breakfast. 90 tablet 1    amLODIPine (NORVASC) 5 MG tablet TAKE 1 TABLET BY MOUTH DAILY. 30 tablet 0     No facility-administered medications prior to visit.       No opioid medication identified on active medication list. I have reviewed chart for other potential  high risk medication/s and harmful drug interactions in the elderly.        Aspirin is on active medication list. Aspirin use is indicated based on review of current medical condition/s. Pros and cons of this therapy have been discussed today. Benefits of this medication outweigh potential harm.  Patient has been encouraged to continue taking this medication.  .      Patient Active Problem List   Diagnosis    Acute CVA (cerebrovascular accident)    Primary hypertension    Coronary artery disease involving native coronary artery of native heart without angina pectoris    Mixed hyperlipidemia     Advance Care Planning   Advance Care Planning     Advance Directive is not on file.  ACP  discussion was held with the patient during this visit. Patient has an advance directive (not in EMR), copy requested. Pt sister would be her healthcare surrogate; Sapna [phone]     Objective    Vitals:    24 1344   BP: 132/90   BP Location: Left arm   Patient Position: Sitting   Cuff Size: Large Adult   Pulse: 98   SpO2: 98%     There is no height or weight on file to calculate BMI.    BMI cannot be calculated due to outdated height or weight values.  Please input a current height/weight in Vitals and re-renter BMIFOLLOWUP in Note to pull in correct documentation based on BMI range.      Does the patient have evidence of cognitive impairment? No          HEALTH RISK ASSESSMENT    Smoking Status:  Social History     Tobacco Use   Smoking Status Not on file   Smokeless Tobacco Not on file     Alcohol Consumption:  Social History     Substance and Sexual Activity   Alcohol Use None     Fall Risk Screen:    MANAV Fall Risk Assessment was completed, and patient is at MODERATE risk for falls. Assessment completed on:5/3/2024    Depression Screenin/3/2024     1:45 PM   PHQ-2/PHQ-9 Depression Screening   Little Interest or Pleasure in Doing Things 0-->not at all   Feeling Down, Depressed or Hopeless 0-->not at all   PHQ-9: Brief Depression Severity Measure Score 0       Health Habits and Functional and Cognitive Screenin/3/2024     1:44 PM   Functional & Cognitive Status   Do you have difficulty preparing food and eating? No   Do you have difficulty bathing yourself, getting dressed or grooming yourself? No   Do you have difficulty using the toilet? No   Do you have difficulty moving around from place to place? Yes   Do you have trouble with steps or getting out of a bed or a chair? Yes   Current Diet Unhealthy Diet   Dental Exam Not up to date   Eye Exam Not up to date   Exercise (times per week) 0 times per week   Current Exercises Include No Regular Exercise   Do you need help using  the phone?  No   Are you deaf or do you have serious difficulty hearing?  No   Do you need help to go to places out of walking distance? Yes   Do you need help shopping? Yes   Do you need help preparing meals?  No   Do you need help with housework?  No   Do you need help with laundry? No   Do you need help taking your medications? No   Do you need help managing money? No   Do you ever drive or ride in a car without wearing a seat belt? No   Have you felt unusual stress, anger or loneliness in the last month? No   Who do you live with? Alone   If you need help, do you have trouble finding someone available to you? No   Do you have difficulty concentrating, remembering or making decisions? No       Age-appropriate Screening Schedule:  Refer to the list below for future screening recommendations based on patient's age, sex and/or medical conditions. Orders for these recommended tests are listed in the plan section. The patient has been provided with a written plan.    Health Maintenance   Topic Date Due    MAMMOGRAM  Never done    DXA SCAN  Never done    COLORECTAL CANCER SCREENING  Never done    TDAP/TD VACCINES (1 - Tdap) Never done    ZOSTER VACCINE (1 of 2) Never done    RSV Vaccine - Adults (1 - 1-dose 60+ series) Never done    Pneumococcal Vaccine 65+ (2 of 2 - PPSV23 or PCV20) 06/23/2021    ANNUAL WELLNESS VISIT  Never done    COVID-19 Vaccine (4 - 2023-24 season) 09/01/2023    LIPID PANEL  07/25/2024    INFLUENZA VACCINE  08/01/2024    HEPATITIS C SCREENING  Completed                  CMS Preventative Services Quick Reference  Risk Factors Identified During Encounter  Fall Risk-High or Moderate: Discussed Fall Prevention in the home  The above risks/problems have been discussed with the patient.  Pertinent information has been shared with the patient in the After Visit Summary.  An After Visit Summary and PPPS were made available to the patient.    Follow Up:   Next Medicare Wellness visit to be scheduled in 1  year.       Additional E&M Note during same encounter follows:  Patient has multiple medical problems which are significant and separately identifiable that require additional work above and beyond the Medicare Wellness Visit.      Chief Complaint  Medicare Wellness-subsequent    Subjective        HPI  Veronica Ramirez is also being seen today for as a housecall for follow-up of her chronic medical problems she says is difficult for her to get back into her house she has to steps going into her house and it is difficult for her to get up without assistance so she prefers housecalls.  She has not yet seen the nephrologist as directed    She feels like she is doing pretty well and actually feels good she says.    She did have 1 fall she tripped over her blanket that got hung up on her walker but she did not injure herself.  This happened about a month ago.    Review of Systems   Constitutional: Negative for fatigue and fever.   Respiratory: Negative for cough and shortness of breath.    Cardiovascular: Negative for chest pain and palpitations.   Skin: Negative for rash or itching      She has been taking her amlodipine and iron but really does not want take any other medicines necessarily.  We did discuss her high cholesterol levels and we will repeat blood work today             Objective   Vital Signs:  /90 (BP Location: Left arm, Patient Position: Sitting, Cuff Size: Large Adult)   Pulse 98   SpO2 98%     Physical Exam  Vitals and nursing note reviewed.   Constitutional:       Appearance: Normal appearance. She is obese.      Comments: Alert pleasant obese black female no acute distress sitting in her chair she does have her walker next to her.  There is noted to be a lot of clutter about the house.  And we instructed her to try to keep things out of the walkways and remove throw rugs and clutter so she does not trip or fall.   HENT:      Head: Normocephalic and atraumatic.      Right Ear: Tympanic membrane,  ear canal and external ear normal.      Left Ear: Tympanic membrane, ear canal and external ear normal.      Nose: Nose normal.      Mouth/Throat:      Mouth: Mucous membranes are moist.      Pharynx: Oropharynx is clear.   Eyes:      Conjunctiva/sclera: Conjunctivae normal.      Pupils: Pupils are equal, round, and reactive to light.   Cardiovascular:      Rate and Rhythm: Normal rate and regular rhythm.   Pulmonary:      Effort: Pulmonary effort is normal.      Breath sounds: Normal breath sounds.   Abdominal:      Palpations: Abdomen is soft.      Tenderness: There is no abdominal tenderness.   Musculoskeletal:         General: No tenderness. Normal range of motion.      Cervical back: Normal range of motion and neck supple.      Right lower leg: Edema present.      Left lower leg: Edema present.      Comments: She does have bilateral stable edema and skin on the lower extremities is dry and somewhat scaly and rough   Lymphadenopathy:      Cervical: No cervical adenopathy.   Skin:     General: Skin is warm and dry.   Neurological:      General: No focal deficit present.      Mental Status: She is alert. Mental status is at baseline.      Cranial Nerves: No cranial nerve deficit.   Psychiatric:         Mood and Affect: Mood normal.         Behavior: Behavior normal.              Old labs reviewed with her.           Assessment and Plan   Diagnoses and all orders for this visit:    1. Encounter for subsequent annual wellness visit (AWV) in Medicare patient (Primary)    2. Primary hypertension  -     amLODIPine (NORVASC) 5 MG tablet; Take 1 tablet by mouth Daily.  Dispense: 90 tablet; Refill: 1    3. Chronic anemia  -     CBC Auto Differential; Future  -     Iron Profile; Future  -     Ferritin  -     CBC Auto Differential  -     Iron Profile    4. High risk medication use  -     Ferritin    5. Mixed hyperlipidemia  -     CBC Auto Differential; Future  -     Comprehensive Metabolic Panel; Future  -     Lipid Panel;  Future  -     CBC Auto Differential  -     Comprehensive Metabolic Panel  -     Lipid Panel    6. History of CVA (cerebrovascular accident)    7. CKD (chronic kidney disease) stage 4, GFR 15-29 ml/min    8. Hyperglycemia  -     Hemoglobin A1c; Future  -     Hemoglobin A1c    9. Morbid (severe) obesity due to excess calories    Annual wellness completed    Lab work drawn instruct her to make sure we have discussed blood work next week or so    Once again encouraged her to see nephrology as she has at least CKD 4 based on last year's blood work we will repeat blood work at this time    For hyperlipidemia she needs to avoid fried fatty foods and cut back on carbs concentrated sweets as directed    Will continue amlodipine for now blood pressure is holding steady and her iron as directed    She really does not wish to do any screenings due to her difficulties getting in and out of the house etc.    She declined pneumonia shot today    Will anticipate following up in 6 months for another housecall certainly if she has any chest pain shortness of breath stroke symptoms she knows to call 911 to get to the ER immediately.         Follow Up   No follow-ups on file.  Patient was given instructions and counseling regarding her condition or for health maintenance advice. Please see specific information pulled into the AVS if appropriate.

## 2024-05-04 DIAGNOSIS — E78.2 MIXED HYPERLIPIDEMIA: Primary | ICD-10-CM

## 2024-05-04 DIAGNOSIS — Z79.899 HIGH RISK MEDICATION USE: ICD-10-CM

## 2024-05-04 LAB
ALBUMIN SERPL-MCNC: 4.1 G/DL (ref 3.8–4.8)
ALBUMIN/GLOB SERPL: 1.3 {RATIO} (ref 1.2–2.2)
ALP SERPL-CCNC: 132 IU/L (ref 44–121)
ALT SERPL-CCNC: 5 IU/L (ref 0–32)
AST SERPL-CCNC: 10 IU/L (ref 0–40)
BASOPHILS # BLD AUTO: 0 X10E3/UL (ref 0–0.2)
BASOPHILS NFR BLD AUTO: 1 %
BILIRUB SERPL-MCNC: <0.2 MG/DL (ref 0–1.2)
BUN SERPL-MCNC: 34 MG/DL (ref 8–27)
BUN/CREAT SERPL: 15 (ref 12–28)
CALCIUM SERPL-MCNC: 9.2 MG/DL (ref 8.7–10.3)
CHLORIDE SERPL-SCNC: 110 MMOL/L (ref 96–106)
CHOLEST SERPL-MCNC: 262 MG/DL (ref 100–199)
CO2 SERPL-SCNC: 17 MMOL/L (ref 20–29)
CREAT SERPL-MCNC: 2.28 MG/DL (ref 0.57–1)
EGFRCR SERPLBLD CKD-EPI 2021: 22 ML/MIN/1.73
EOSINOPHIL # BLD AUTO: 0.2 X10E3/UL (ref 0–0.4)
EOSINOPHIL NFR BLD AUTO: 3 %
ERYTHROCYTE [DISTWIDTH] IN BLOOD BY AUTOMATED COUNT: 13.6 % (ref 11.7–15.4)
FERRITIN SERPL-MCNC: 242 NG/ML (ref 15–150)
GLOBULIN SER CALC-MCNC: 3.2 G/DL (ref 1.5–4.5)
GLUCOSE SERPL-MCNC: 113 MG/DL (ref 70–99)
HBA1C MFR BLD: 5.3 % (ref 4.8–5.6)
HCT VFR BLD AUTO: 31.7 % (ref 34–46.6)
HDLC SERPL-MCNC: 55 MG/DL
HGB BLD-MCNC: 10.2 G/DL (ref 11.1–15.9)
IMM GRANULOCYTES # BLD AUTO: 0 X10E3/UL (ref 0–0.1)
IMM GRANULOCYTES NFR BLD AUTO: 0 %
IRON SATN MFR SERPL: 18 % (ref 15–55)
IRON SERPL-MCNC: 43 UG/DL (ref 27–139)
LABORATORY COMMENT REPORT: ABNORMAL
LDLC SERPL CALC-MCNC: 192 MG/DL (ref 0–99)
LYMPHOCYTES # BLD AUTO: 1.7 X10E3/UL (ref 0.7–3.1)
LYMPHOCYTES NFR BLD AUTO: 22 %
MCH RBC QN AUTO: 28.7 PG (ref 26.6–33)
MCHC RBC AUTO-ENTMCNC: 32.2 G/DL (ref 31.5–35.7)
MCV RBC AUTO: 89 FL (ref 79–97)
MONOCYTES # BLD AUTO: 0.7 X10E3/UL (ref 0.1–0.9)
MONOCYTES NFR BLD AUTO: 9 %
NEUTROPHILS # BLD AUTO: 5 X10E3/UL (ref 1.4–7)
NEUTROPHILS NFR BLD AUTO: 65 %
PLATELET # BLD AUTO: 276 X10E3/UL (ref 150–450)
POTASSIUM SERPL-SCNC: 3.9 MMOL/L (ref 3.5–5.2)
PROT SERPL-MCNC: 7.3 G/DL (ref 6–8.5)
RBC # BLD AUTO: 3.56 X10E6/UL (ref 3.77–5.28)
SODIUM SERPL-SCNC: 143 MMOL/L (ref 134–144)
TIBC SERPL-MCNC: 238 UG/DL (ref 250–450)
TRIGL SERPL-MCNC: 87 MG/DL (ref 0–149)
UIBC SERPL-MCNC: 195 UG/DL (ref 118–369)
VLDLC SERPL CALC-MCNC: 15 MG/DL (ref 5–40)
WBC # BLD AUTO: 7.7 X10E3/UL (ref 3.4–10.8)

## 2024-05-04 RX ORDER — ROSUVASTATIN CALCIUM 10 MG/1
10 TABLET, COATED ORAL DAILY
Qty: 90 TABLET | Refills: 1 | Status: SHIPPED | OUTPATIENT
Start: 2024-05-04

## 2024-09-16 ENCOUNTER — TELEPHONE (OUTPATIENT)
Dept: FAMILY MEDICINE CLINIC | Facility: CLINIC | Age: 74
End: 2024-09-16

## 2024-09-16 NOTE — TELEPHONE ENCOUNTER
Patient would like to get her home visit and blood work scheduled at Dr León earliest convenience.

## 2024-09-17 RX ORDER — FERROUS SULFATE 325(65) MG
325 TABLET ORAL
Qty: 90 TABLET | Refills: 1 | Status: SHIPPED | OUTPATIENT
Start: 2024-09-17

## 2024-10-04 NOTE — TELEPHONE ENCOUNTER
Caller: Veronica Ramirez    Relationship: Self    Best call back number: [phone]     What orders are you requesting (i.e. lab or imaging): BLOOD WORK    In what timeframe would the patient need to come in: ASAP    Where will you receive your lab/imaging services: HOME    Additional notes: PT STATED SHE IS NEEDING HER BLOOD WORK DONE BUT SHE IS UNABLE TO LEAVE HOME AT THIS TIME DUE TO HAVING A STROKE. PT NEEDS AN AT HOME APPT FOR BLOOD WORK. PLEASE CALL PT TO SET UP APPT.

## 2024-10-08 ENCOUNTER — OFFICE VISIT (OUTPATIENT)
Dept: FAMILY MEDICINE CLINIC | Facility: CLINIC | Age: 74
End: 2024-10-08
Payer: MEDICARE

## 2024-10-08 VITALS — DIASTOLIC BLOOD PRESSURE: 82 MMHG | SYSTOLIC BLOOD PRESSURE: 140 MMHG | OXYGEN SATURATION: 99 % | HEART RATE: 90 BPM

## 2024-10-08 DIAGNOSIS — D64.9 CHRONIC ANEMIA: ICD-10-CM

## 2024-10-08 DIAGNOSIS — Z86.73 HISTORY OF CVA (CEREBROVASCULAR ACCIDENT): ICD-10-CM

## 2024-10-08 DIAGNOSIS — Z79.899 HIGH RISK MEDICATION USE: ICD-10-CM

## 2024-10-08 DIAGNOSIS — I10 PRIMARY HYPERTENSION: ICD-10-CM

## 2024-10-08 DIAGNOSIS — E78.2 MIXED HYPERLIPIDEMIA: Primary | ICD-10-CM

## 2024-10-08 PROCEDURE — 99214 OFFICE O/P EST MOD 30 MIN: CPT | Performed by: FAMILY MEDICINE

## 2024-10-08 PROCEDURE — 3079F DIAST BP 80-89 MM HG: CPT | Performed by: FAMILY MEDICINE

## 2024-10-08 PROCEDURE — G2211 COMPLEX E/M VISIT ADD ON: HCPCS | Performed by: FAMILY MEDICINE

## 2024-10-08 PROCEDURE — 3077F SYST BP >= 140 MM HG: CPT | Performed by: FAMILY MEDICINE

## 2024-10-08 RX ORDER — AMLODIPINE BESYLATE 5 MG/1
5 TABLET ORAL DAILY
Qty: 90 TABLET | Refills: 1 | Status: SHIPPED | OUTPATIENT
Start: 2024-10-08

## 2024-10-08 NOTE — PROGRESS NOTES
Follow Up Office Visit      Patient Name: Veronica Raimrez  : 1950   MRN: [id number]     Chief Complaint:    Chief Complaint   Patient presents with    Follow-up       History of Present Illness: Veronica Ramirez is a 74 y.o. female who is being seen at her house as a housecall due to difficulties getting out and about.  She has been taking her rosuvastatin her iron amlodipine and baby aspirin as directed.  Says she may occasionally get a little constipated and just uses MiraLAX for that and has been doing well.  She relates that she found a YouTube video showing some chair exercises she has been doing those twice a day now and says she will get a pretty good workout they will actually work up a little sweat.    She says she feels good and everyone tells her she is looking good.    Unfortunately her mother passed away today at 103 years of age.    Review of Systems   Constitutional: Negative for fatigue and fever.   Respiratory: Negative for cough and shortness of breath.    Cardiovascular: Negative for chest pain and palpitations.   Skin: Negative for rash or itching    She wonders about getting back to driving and has not been out to see nephrology or any other doctors.  We are seeing her at her home.    Subjective      Review of Systems:   Review of Systems    Past Medical History:   Past Medical History:   Diagnosis Date    Kidney stones     Morbid obesity        Past Surgical History:   Past Surgical History:   Procedure Laterality Date    BREAST LUMPECTOMY      Breast BX - 2 Lumpectomy 17, and 26 YO--Benign    REDUCTION MAMMAPLASTY      Surgery at 37       Family History:   Family History   Problem Relation Age of Onset    Cancer Mother     Hypertension Mother     Obesity Mother        Social History:   Social History     Socioeconomic History    Marital status: Single   Vaping Use    Vaping status: Never Used       Medications:     Current Outpatient Medications:     amLODIPine (NORVASC) 5 MG tablet,  Take 1 tablet by mouth Daily., Disp: 90 tablet, Rfl: 1    aspirin 81 MG chewable tablet, Chew 1 tablet Daily., Disp: 90 tablet, Rfl: 1    ferrous sulfate 325 (65 FE) MG tablet, Take 1 tablet by mouth Daily With Breakfast., Disp: 90 tablet, Rfl: 1    rosuvastatin (Crestor) 10 MG tablet, Take 1 tablet by mouth Daily., Disp: 90 tablet, Rfl: 1    Allergies:   No Known Allergies    Objective     Physical Exam:  Vital Signs:   Vitals:    10/08/24 1203   BP: 140/82   BP Location: Left arm   Patient Position: Sitting   Cuff Size: Large Adult   Pulse: 90   SpO2: 99%     No height and weight on file for this encounter.  There is no height or weight on file to calculate BMI.     Physical Exam  Vitals and nursing note reviewed.   Constitutional:       Appearance: Normal appearance. She is obese.      Comments: Alert oriented pleasant -American female sitting in her chair by the front door in no acute distress she does use a walker   HENT:      Head: Normocephalic and atraumatic.      Nose: Nose normal.   Cardiovascular:      Rate and Rhythm: Normal rate and regular rhythm.   Pulmonary:      Effort: Pulmonary effort is normal.      Breath sounds: Normal breath sounds.   Abdominal:      Palpations: Abdomen is soft.   Musculoskeletal:         General: Normal range of motion.      Cervical back: Normal range of motion and neck supple.      Right lower leg: No edema.      Left lower leg: No edema.   Skin:     General: Skin is warm and dry.   Neurological:      General: No focal deficit present.      Mental Status: She is alert.   Psychiatric:         Mood and Affect: Mood normal.         Behavior: Behavior normal.         Procedures    PHQ-9 Total Score:       Assessment / Plan      Assessment/Plan:   Diagnoses and all orders for this visit:    1. Mixed hyperlipidemia (Primary)  -     CBC Auto Differential; Future  -     Comprehensive Metabolic Panel; Future  -     Lipid Panel; Future  -     CK; Future    2. Primary  hypertension  -     amLODIPine (NORVASC) 5 MG tablet; Take 1 tablet by mouth Daily.  Dispense: 90 tablet; Refill: 1    3. High risk medication use  -     CBC Auto Differential; Future  -     Comprehensive Metabolic Panel; Future  -     Lipid Panel; Future  -     CK; Future    4. Chronic anemia  -     CBC Auto Differential; Future    5. History of CVA (cerebrovascular accident)         Old labs reviewed with her.    We will get CBC CMP lipid and a CK--be sure to discuss blood work results when those come in next week.    We were going to draw blood she was a difficult stick or unable to get blood for specimen will try again next week and instructed her to drink lots of water.    Continue with her iron Crestor amlodipine and aspirin    Blood pressure was good 1 40/82 pulse ox 99% on room air and pulse was 90    We recommended screening mammogram DEXA scan colonoscopy she declined all of those.  Says if she notices any trouble she will let me know    She also declined any colon cancer screenings we discussed colonoscopy and even Cologuard which she could do in the privacy of her home and home she declined those screenings.    She declined flu shot pneumonia shot    I told her she may get tetanus shot COVID shot or shingles vaccine at the pharmacy if she would like    Continue with good chair exercises as she has been doing and be sure to follow-up on blood work when we get that next week.    She did need iron sent into the pharmacy so we will get those medicines refilled.      BMI cannot be calculated due to outdated height or weight values.  Please input a current height/weight in Vitals and re-renter BMIFOLLOWUP in Note to pull in correct documentation based on BMI range.      Follow Up:   No follow-ups on file.        Gonzalez Rosenthal MD  [facility]   Portions of note created with Dragon voice recognition technology

## 2024-10-30 DIAGNOSIS — I10 PRIMARY HYPERTENSION: ICD-10-CM

## 2024-10-30 RX ORDER — AMLODIPINE BESYLATE 5 MG/1
5 TABLET ORAL DAILY
Qty: 90 TABLET | Refills: 1 | OUTPATIENT
Start: 2024-10-30

## 2024-12-14 ENCOUNTER — TELEPHONE (OUTPATIENT)
Dept: FAMILY MEDICINE CLINIC | Facility: CLINIC | Age: 74
End: 2024-12-14
Payer: MEDICARE

## 2024-12-14 NOTE — TELEPHONE ENCOUNTER
Hub may relay  No answer left message for her to call back      I was calling to see if she can come in to get blood work done here as we were unable to draw it at the house call on 2 separate visits--she certainly could just pull up to our side door there and our phlebotomist could come out to the car to do the blood work in the vehicle if that is easier for her.

## 2024-12-16 NOTE — TELEPHONE ENCOUNTER
Name: Veronica Ramirez      Relationship: Self      Best Callback Number: [phone]       HUB PROVIDED THE RELAY MESSAGE FROM THE OFFICE      PATIENT: HAS FURTHER QUESTIONS AND WOULD LIKE A CALL BACK AT THE FOLLOWING PHONE [phone]    ADDITIONAL INFORMATION: PATIENT ADVISES THAT ONCE SHE GETS OUT OF HER HOUSE, THAT IT TAKES 2 MEN TO GET HER BACK IN THE HOUSE AND THAT IS WHY SHE CAN'T HARDLY COME OUT BECAUSE SHE DOES NOT HAVE THE MAN POWER READILY AVAILABLE TO HER.

## 2025-01-21 RX ORDER — PNV NO.95/FERROUS FUM/FOLIC AC 28MG-0.8MG
1 TABLET ORAL
Qty: 90 TABLET | Refills: 1 | Status: SHIPPED | OUTPATIENT
Start: 2025-01-21

## 2025-03-14 ENCOUNTER — TELEPHONE (OUTPATIENT)
Dept: FAMILY MEDICINE CLINIC | Facility: CLINIC | Age: 75
End: 2025-03-14
Payer: MEDICARE

## 2025-03-14 NOTE — TELEPHONE ENCOUNTER
Name: Veronica Ramirez      Relationship: Self      Best Callback Number: [phone]       HUB PROVIDED THE RELAY MESSAGE FROM THE OFFICE      PATIENT: HAS FURTHER QUESTIONS AND WOULD LIKE A CALL BACK AT THE FOLLOWING PHONE NUMBERPATIENT IS ASKING WHAT ELSE HOME HEALTH WOULD NEED TO DO TO COME OUT AND SEE HER AND DRAW BLOOD. SHE SAYS A RAMP IS NOT THE ISSUE WITH HER MOBILITY

## 2025-03-14 NOTE — TELEPHONE ENCOUNTER
Call to house    Hub may relay  No answer left brief message    I tried to call regarding her blood work that needs to be done and we did a housecall and my assistant went out 2 separate times and was unable to get blood from her and she has been unable to get into the office here says it is difficult for her to get in and out of the house and she needs help to do that as she has a couple steps going up to her house.    We have tried calling home health to see if they would draw blood but apparently they will not go just to draw blood.    I was trying to come up with some ideas of solutions to help her maybe get someone to build her ramp and then they could use a wheelchair to push her up into the house?    But wanted to stress the importance of getting routine blood work as part of her healthcare and helping guide our medical decisions.

## 2025-03-15 ENCOUNTER — TELEPHONE (OUTPATIENT)
Dept: FAMILY MEDICINE CLINIC | Facility: CLINIC | Age: 75
End: 2025-03-15
Payer: MEDICARE

## 2025-03-15 DIAGNOSIS — R73.9 HYPERGLYCEMIA: ICD-10-CM

## 2025-03-15 DIAGNOSIS — Z86.73 HISTORY OF CVA (CEREBROVASCULAR ACCIDENT): Primary | ICD-10-CM

## 2025-03-15 DIAGNOSIS — E66.01 MORBID (SEVERE) OBESITY DUE TO EXCESS CALORIES: ICD-10-CM

## 2025-03-15 DIAGNOSIS — N18.4 CKD (CHRONIC KIDNEY DISEASE) STAGE 4, GFR 15-29 ML/MIN: ICD-10-CM

## 2025-03-15 DIAGNOSIS — I10 PRIMARY HYPERTENSION: ICD-10-CM

## 2025-03-15 DIAGNOSIS — Z79.899 HIGH RISK MEDICATION USE: ICD-10-CM

## 2025-03-15 DIAGNOSIS — E78.2 MIXED HYPERLIPIDEMIA: ICD-10-CM

## 2025-03-15 DIAGNOSIS — D64.9 CHRONIC ANEMIA: ICD-10-CM

## 2025-03-15 NOTE — TELEPHONE ENCOUNTER
Called  her and she wonders about caretenders or some other home  health agency coming and they  can  do physical therapy and strengthening also.  As she has great difficulty getting in the 4 steps to her house.  So cannot make it to doctors appointments or get out much at all.    And  she relates that she has a  former  student looking to  re  do her  steps to make  them larger so  her  walker  will fit and hopefully make it easier for her to get in and out of the house.    She needs blood work and strengthening and physical therapy Occupational Therapy at home    She also would like to be called a day in advance so she can schedule the the visit with home health etc.

## 2025-03-18 ENCOUNTER — TELEPHONE (OUTPATIENT)
Dept: FAMILY MEDICINE CLINIC | Facility: CLINIC | Age: 75
End: 2025-03-18
Payer: MEDICARE

## 2025-04-10 ENCOUNTER — TELEPHONE (OUTPATIENT)
Dept: FAMILY MEDICINE CLINIC | Facility: CLINIC | Age: 75
End: 2025-04-10

## 2025-04-10 NOTE — TELEPHONE ENCOUNTER
Caller: Veronica Ramirez    Relationship: Self    Best call back number: [phone]     What is the best time to reach you: ANYTIME    Who are you requesting to speak with (clinical staff, provider,  specific staff member): DR LAURYN JACKSON    What was the call regarding: PATIENT WANTS TO SPEAK TO  ABOUT PHYSICAL THERAPY AND HAVING HER BLOODWORK TAKEN AT HOME

## 2025-04-11 ENCOUNTER — TELEPHONE (OUTPATIENT)
Dept: FAMILY MEDICINE CLINIC | Facility: CLINIC | Age: 75
End: 2025-04-11
Payer: MEDICARE
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
## 2025-04-11 NOTE — TELEPHONE ENCOUNTER
PT CALLED TO CHECK STATUS FOR CALL BACK, ALSO STATED THAT SHE BELIEVES THAT SHE HAS A UTI AND EAR INFECTION, PT STATED THAT SHE WOULD LIKE TO SPEAK WITH PROVIDER FIRST BEFORE MAKING ANY APPT.

## 2025-04-28 ENCOUNTER — OFFICE VISIT (OUTPATIENT)
Dept: FAMILY MEDICINE CLINIC | Facility: CLINIC | Age: 75
End: 2025-04-28
Payer: MEDICARE

## 2025-04-28 VITALS
OXYGEN SATURATION: 96 % | WEIGHT: 293 LBS | BODY MASS INDEX: 53.92 KG/M2 | RESPIRATION RATE: 16 BRPM | TEMPERATURE: 98.5 F | HEART RATE: 86 BPM | DIASTOLIC BLOOD PRESSURE: 70 MMHG | SYSTOLIC BLOOD PRESSURE: 100 MMHG | HEIGHT: 62 IN

## 2025-04-28 DIAGNOSIS — Z13.1 SCREENING FOR DIABETES MELLITUS (DM): ICD-10-CM

## 2025-04-28 DIAGNOSIS — D64.9 CHRONIC ANEMIA: ICD-10-CM

## 2025-04-28 DIAGNOSIS — Z00.00 ANNUAL PHYSICAL EXAM: Primary | ICD-10-CM

## 2025-04-28 DIAGNOSIS — Z13.29 SCREENING FOR THYROID DISORDER: ICD-10-CM

## 2025-04-28 DIAGNOSIS — N18.4 CKD (CHRONIC KIDNEY DISEASE) STAGE 4, GFR 15-29 ML/MIN: ICD-10-CM

## 2025-04-28 DIAGNOSIS — I10 PRIMARY HYPERTENSION: ICD-10-CM

## 2025-04-28 RX ORDER — AMLODIPINE BESYLATE 5 MG/1
2.5 TABLET ORAL DAILY
Qty: 90 TABLET | Refills: 1 | Status: SHIPPED | OUTPATIENT
Start: 2025-04-28

## 2025-04-28 NOTE — PROGRESS NOTES
"     Acute Office Visit      Date: 2025  Patient Name: Veronica Ramirez  : 1950   MRN: [id number]     Chief Complaint   Patient presents with    Med Dose Change     Patient wants to lower amlodipine dose       History of Present Illness: Veronica Ramirez is a 74 y.o. female who is here today to discuss adjustment of blood pressure medication.  She is seated in wheelchair and transfers from vehicle to wheelchair using a walker.  Patient reports she has difficulty ambulating and getting to appointments outside her home.  She is currently taking amlodipine 5 mg/day.  Patient reports compliance with medication and although blood pressure looks low today she is unable to discuss if this is an issue at home, she is not checking her blood pressure at home.  Dr. Rosenthal did stop by to talk to her for couple minutes during her appointment.        Subjective     Constitutional: no fever or night sweats.    HEENT:  no changes in vision or eye pain. no ear discharge, nose bleed or bleeding gums.   CV: no chest pain or palpitations.    Respiratory: no SOB or cough.    GI: no vomiting or diarrhea.   : no difficulty in urination or blood in urine.    MUSC: no muscle or joint pain.   Skin: no rashes or itching.    Neurologic: A&O x 3, no headaches or tremors.   Psych: No SI/HI     Current Outpatient Medications   Medication Instructions    amLODIPine (NORVASC) 2.5 mg, Oral, Daily    aspirin 81 mg, Oral, Daily    ferrous sulfate 325 mg, Oral, Daily With Breakfast    rosuvastatin (CRESTOR) 10 mg, Oral, Daily       No Known Allergies    Objective     Vitals:    25 1412   BP: 100/70   BP Location: Right arm   Patient Position: Sitting   Cuff Size: Large Adult   Pulse: 86   Resp: 16   Temp: 98.5 °F (36.9 °C)   TempSrc: Oral   SpO2: 96%   Weight: (!) 139 kg (306 lb 11.2 oz)   Height: 157.5 cm (62\")   PainSc: 0-No pain       Body mass index is 56.1 kg/m².     Physical Exam  Vitals and nursing note reviewed. "   Constitutional:       General: She is not in acute distress.     Appearance: She is obese.   HENT:      Head: Normocephalic.   Eyes:      Pupils: Pupils are equal, round, and reactive to light.   Cardiovascular:      Rate and Rhythm: Normal rate and regular rhythm.   Pulmonary:      Effort: Pulmonary effort is normal.      Breath sounds: Normal breath sounds.   Musculoskeletal:         General: Normal range of motion.   Skin:     General: Skin is warm and dry.   Neurological:      Mental Status: She is alert and oriented to person, place, and time.   Psychiatric:         Mood and Affect: Mood normal.         Assessment / Plan         Annual physical exam  Patient is due annual wellness exam coming up with Dr. Rosenthal in May  Lab work collected today and patient will follow-up with PCP to complete annual wellness visit    Orders:    CBC Auto Differential; Future    Comprehensive Metabolic Panel; Future    Lipid Panel; Future    Primary hypertension  Hypertension previously stable, blood pressure low today  Taking medications as directed   Denies missed doses, denies side effects   Does not report any headaches, blurry vision, dizziness, chest pain, shortness of breath, or palpitations.    Denies fevers, chills, or night sweats  Currently not checking blood pressures at home    Plan:  Decrease amlodipine to 2.5 mg/day  Patient agrees to monitor home blood pressures and bring log sheet to follow-up visit with PCP  Report back to the office if blood pressures consistently >140/90  Pt verbalizes understanding if she develops chest pain, shortness of breath or other alarm symptoms she should call 911 or go to the ER as appropriate.    Orders:    amLODIPine (NORVASC) 5 MG tablet; Take 0.5 tablets by mouth Daily.    Screening for diabetes mellitus (DM)    Orders:    Hemoglobin A1c; Future    Screening for thyroid disorder    Orders:    TSH Rfx On Abnormal To Free T4; Future    CKD (chronic kidney disease) stage 4, GFR 15-29  ml/min  Renal condition is chronic, previously identified .  Continue current treatment regimen.  No change in treatment regimen pending lab results  Renal condition will be reassessed  at follow-up visit with PCP .       Chronic anemia    Orders:    Iron Profile; Future    Concern for urinary tract symptoms  Patient refuses in office urinalysis but agrees to take hat and sterile specimen container Home and return urine sample at a later date.    Follow Up:   Return in about 4 weeks (around 5/26/2025) for Medicare Wellness-with PCP, Dr. Rosenthal.    Patient Education:   Reviewed medications, potential side effects and signs and symptoms to report.   Discussed risk vs benefits of treatment plan with patient including medications, labs, and imaging.    Patient education materials attached to AVS and reviewed with patient during office visit today.   Addressed questions and concerns during visit. Patient verbalized understanding and agrees with tx plan.   Instructed to call the office with any questions and report to ER with any life-threatening symptoms.    ELIUD Hernandez (Libby) [facility]  [address] [place] [id number] [phone]    NOTE TO PATIENT:   The 21st Century Cures Act makes medical notes like these available to patients in the interest of transparency. However, be advised this is a medical document. It is intended as peer to peer communication. It is written in medical language and may contain abbreviations or verbiage that are unfamiliar. It may appear blunt or direct. Medical documents are intended to carry relevant information, facts as evident, and the clinical opinion of the practitioner.     EMR Dragon/Transcription disclaimer:   Much of this encounter note is an electronic transcription of spoken language to printed text. Electronic transcription of spoken language may permit erroneous, or at times, nonsensical words or phrases to be  inadvertently transcribed. Although I have reviewed the note for such errors, some may still exist.

## 2025-04-28 NOTE — ASSESSMENT & PLAN NOTE
Hypertension previously stable, blood pressure low today  Taking medications as directed   Denies missed doses, denies side effects   Does not report any headaches, blurry vision, dizziness, chest pain, shortness of breath, or palpitations.    Denies fevers, chills, or night sweats  Currently not checking blood pressures at home    Plan:  Decrease amlodipine to 2.5 mg/day  Patient agrees to monitor home blood pressures and bring log sheet to follow-up visit with PCP  Report back to the office if blood pressures consistently >140/90  Pt verbalizes understanding if she develops chest pain, shortness of breath or other alarm symptoms she should call 911 or go to the ER as appropriate.    Orders:    amLODIPine (NORVASC) 5 MG tablet; Take 0.5 tablets by mouth Daily.     N/A N/A N/A

## 2025-04-29 LAB
ALBUMIN SERPL-MCNC: 3.5 G/DL (ref 3.8–4.8)
ALP SERPL-CCNC: 135 IU/L (ref 44–121)
ALT SERPL-CCNC: 6 IU/L (ref 0–32)
AST SERPL-CCNC: 11 IU/L (ref 0–40)
BASOPHILS # BLD AUTO: 0 X10E3/UL (ref 0–0.2)
BASOPHILS NFR BLD AUTO: 0 %
BILIRUB SERPL-MCNC: <0.2 MG/DL (ref 0–1.2)
BUN SERPL-MCNC: 31 MG/DL (ref 8–27)
BUN/CREAT SERPL: 14 (ref 12–28)
CALCIUM SERPL-MCNC: 8.3 MG/DL (ref 8.7–10.3)
CHLORIDE SERPL-SCNC: 108 MMOL/L (ref 96–106)
CHOLEST SERPL-MCNC: 221 MG/DL (ref 100–199)
CO2 SERPL-SCNC: 18 MMOL/L (ref 20–29)
CREAT SERPL-MCNC: 2.23 MG/DL (ref 0.57–1)
EGFRCR SERPLBLD CKD-EPI 2021: 23 ML/MIN/1.73
EOSINOPHIL # BLD AUTO: 0.2 X10E3/UL (ref 0–0.4)
EOSINOPHIL NFR BLD AUTO: 2 %
ERYTHROCYTE [DISTWIDTH] IN BLOOD BY AUTOMATED COUNT: 13.8 % (ref 11.7–15.4)
GLOBULIN SER CALC-MCNC: 3.1 G/DL (ref 1.5–4.5)
GLUCOSE SERPL-MCNC: 89 MG/DL (ref 70–99)
HBA1C MFR BLD: 5.4 % (ref 4.8–5.6)
HCT VFR BLD AUTO: 31 % (ref 34–46.6)
HDLC SERPL-MCNC: 50 MG/DL
HGB BLD-MCNC: 9.4 G/DL (ref 11.1–15.9)
IMM GRANULOCYTES # BLD AUTO: 0 X10E3/UL (ref 0–0.1)
IMM GRANULOCYTES NFR BLD AUTO: 0 %
IRON SATN MFR SERPL: 20 % (ref 15–55)
IRON SERPL-MCNC: 42 UG/DL (ref 27–139)
LDLC SERPL CALC-MCNC: 157 MG/DL (ref 0–99)
LYMPHOCYTES # BLD AUTO: 1.4 X10E3/UL (ref 0.7–3.1)
LYMPHOCYTES NFR BLD AUTO: 20 %
MCH RBC QN AUTO: 27.7 PG (ref 26.6–33)
MCHC RBC AUTO-ENTMCNC: 30.3 G/DL (ref 31.5–35.7)
MCV RBC AUTO: 91 FL (ref 79–97)
MONOCYTES # BLD AUTO: 0.5 X10E3/UL (ref 0.1–0.9)
MONOCYTES NFR BLD AUTO: 7 %
NEUTROPHILS # BLD AUTO: 5.1 X10E3/UL (ref 1.4–7)
NEUTROPHILS NFR BLD AUTO: 71 %
PLATELET # BLD AUTO: 304 X10E3/UL (ref 150–450)
POTASSIUM SERPL-SCNC: 4.6 MMOL/L (ref 3.5–5.2)
PROT SERPL-MCNC: 6.6 G/DL (ref 6–8.5)
RBC # BLD AUTO: 3.39 X10E6/UL (ref 3.77–5.28)
SODIUM SERPL-SCNC: 142 MMOL/L (ref 134–144)
TIBC SERPL-MCNC: 215 UG/DL (ref 250–450)
TRIGL SERPL-MCNC: 78 MG/DL (ref 0–149)
TSH SERPL DL<=0.005 MIU/L-ACNC: 1.08 UIU/ML (ref 0.45–4.5)
UIBC SERPL-MCNC: 173 UG/DL (ref 118–369)
VLDLC SERPL CALC-MCNC: 14 MG/DL (ref 5–40)
WBC # BLD AUTO: 7.2 X10E3/UL (ref 3.4–10.8)

## 2025-05-08 DIAGNOSIS — E66.9 OBESITY WITH SERIOUS COMORBIDITY, UNSPECIFIED CLASS, UNSPECIFIED OBESITY TYPE: ICD-10-CM

## 2025-05-08 DIAGNOSIS — Z86.73 HISTORY OF CVA (CEREBROVASCULAR ACCIDENT): Primary | ICD-10-CM

## 2025-05-08 DIAGNOSIS — R53.81 DEBILITY: ICD-10-CM

## 2025-05-09 ENCOUNTER — TELEPHONE (OUTPATIENT)
Dept: FAMILY MEDICINE CLINIC | Facility: CLINIC | Age: 75
End: 2025-05-09
Payer: MEDICARE

## 2025-05-09 NOTE — TELEPHONE ENCOUNTER
"Dr. Rosenthal's MA Sommer spoke to patient yesterday regarding previous home health referral:    \"She is wanting physical therapy to come to her house. She isnt able to get up the steps into her house. She says she feels like pt would help her on how to navigate steps and get stronger. She has trouble even stepping on the scale here in the office. Says she has a lot of issues with stepping up on anything. She would like to be able to get in and out of her car so she is able to drive on her own again.\"     Dr. Roesnthal submitted new referral for [facility] for in home therapy.  "

## 2025-05-29 ENCOUNTER — TELEPHONE (OUTPATIENT)
Dept: FAMILY MEDICINE CLINIC | Facility: CLINIC | Age: 75
End: 2025-05-29
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
## 2025-05-30 NOTE — TELEPHONE ENCOUNTER
Caller: Veronica Ramirez    Relationship: Self    Best call back number: [phone]    What is the best time to reach you: ANYTIME     Who are you requesting to speak with (clinical staff, provider,  specific staff member): CLINICAL STAFF     PATIENT WOULD LIKE A COPY MAILED TO HER.     [id number]  [facility]